# Patient Record
Sex: MALE | Race: WHITE | NOT HISPANIC OR LATINO | URBAN - METROPOLITAN AREA
[De-identification: names, ages, dates, MRNs, and addresses within clinical notes are randomized per-mention and may not be internally consistent; named-entity substitution may affect disease eponyms.]

---

## 2022-10-06 ENCOUNTER — OFFICE (OUTPATIENT)
Dept: URBAN - METROPOLITAN AREA CLINIC 67 | Facility: CLINIC | Age: 87
End: 2022-10-06
Payer: MEDICARE

## 2022-10-06 VITALS
HEART RATE: 50 BPM | OXYGEN SATURATION: 99 % | HEIGHT: 68 IN | SYSTOLIC BLOOD PRESSURE: 102 MMHG | WEIGHT: 161 LBS | DIASTOLIC BLOOD PRESSURE: 62 MMHG

## 2022-10-06 DIAGNOSIS — K21.9 GASTRO-ESOPHAGEAL REFLUX DISEASE WITHOUT ESOPHAGITIS: ICD-10-CM

## 2022-10-06 DIAGNOSIS — Z87.891 PERSONAL HISTORY OF NICOTINE DEPENDENCE: ICD-10-CM

## 2022-10-06 PROCEDURE — 99203 OFFICE O/P NEW LOW 30 MIN: CPT | Performed by: NURSE PRACTITIONER

## 2022-10-06 RX ORDER — FAMOTIDINE 40 MG/1
TABLET, FILM COATED ORAL
Qty: 90 | Refills: 1 | Status: ACTIVE
Start: 2022-10-06

## 2022-10-25 ENCOUNTER — OFFICE (OUTPATIENT)
Dept: URBAN - METROPOLITAN AREA CLINIC 67 | Facility: CLINIC | Age: 87
End: 2022-10-25
Payer: MEDICARE

## 2022-10-25 VITALS — HEART RATE: 40 BPM | SYSTOLIC BLOOD PRESSURE: 112 MMHG | HEIGHT: 68 IN | DIASTOLIC BLOOD PRESSURE: 58 MMHG

## 2022-10-25 DIAGNOSIS — K21.9 GASTRO-ESOPHAGEAL REFLUX DISEASE WITHOUT ESOPHAGITIS: ICD-10-CM

## 2022-10-25 PROCEDURE — 99204 OFFICE O/P NEW MOD 45 MIN: CPT | Performed by: INTERNAL MEDICINE

## 2022-10-25 RX ORDER — FAMOTIDINE 40 MG/1
TABLET, FILM COATED ORAL
Qty: 90 | Refills: 1 | Status: ACTIVE
Start: 2022-10-06

## 2022-10-25 NOTE — SERVICENOTES
I will have him change his medication so that he takes his Pantoprazole in the evening and Famotidine during the day - hopefully, this regimen will better control his symptoms. I asked them to call back in 2-3 weeks with an update.

## 2022-10-25 NOTE — SERVICEHPINOTES
The patient is seen today for follow-up regarding chronic GERD and dyspepsia-type symptoms. 
esme victoria At the time of his last office visit with one of our NPs on 10/6/22, he was continued on Pantoprazole 40mg in the morning and Famotidine was added at bedtime due to episodes of epigastric burning discomfort at night. 
br Today, he reports that his symptoms have improved with the Famotidine but have not resolved completely and continue to be most bothersome at night - per his report, he had a cardiac evaluation with Dr. Canales and no underlying cardiac etiology for his symptoms was found. 
br His most recent EGD done in 2014 (Dr. Coppola) was remarkable for changes suggestive of SSBE but biopsies were without evidence of barretts. esme

## 2023-05-10 ENCOUNTER — OFFICE (OUTPATIENT)
Dept: URBAN - METROPOLITAN AREA CLINIC 67 | Facility: CLINIC | Age: 88
End: 2023-05-10
Payer: MEDICARE

## 2023-05-10 VITALS
HEIGHT: 68 IN | DIASTOLIC BLOOD PRESSURE: 66 MMHG | OXYGEN SATURATION: 98 % | HEART RATE: 60 BPM | SYSTOLIC BLOOD PRESSURE: 114 MMHG | WEIGHT: 159 LBS

## 2023-05-10 DIAGNOSIS — K21.9 GASTRO-ESOPHAGEAL REFLUX DISEASE WITHOUT ESOPHAGITIS: ICD-10-CM

## 2023-05-10 DIAGNOSIS — Z98.890 OTHER SPECIFIED POSTPROCEDURAL STATES: ICD-10-CM

## 2023-05-10 DIAGNOSIS — R10.9 UNSPECIFIED ABDOMINAL PAIN: ICD-10-CM

## 2023-05-10 PROCEDURE — 99214 OFFICE O/P EST MOD 30 MIN: CPT | Performed by: INTERNAL MEDICINE

## 2023-05-10 NOTE — SERVICENOTES
I had a lengthy discussion with the patient and his wife - I discussed with them that I don't have a good explanation for his episodes of abdominal discomfort but I feel confident that they are not due to reflux. They confirm that these episodes do not linger longer than when he first wakes up and they are more of a nuisance than anything else.
I will have him continue to take his Pantoprazole in the evening (his wife confirms that he takes it on an empty stomach about 30 minutes before an evening snack) and then use OTC Gaviscon as needed in the morning and during the day for any breakthrough heartburn/reflux symptoms. 
For now, I will have them return to see me for follow-up as needed.

## 2023-05-10 NOTE — SERVICEHPINOTES
Kenny Escobar   is seen today for a follow-up visit regarding chronic GERD and dyspepsia-type symptoms. At the time of a follow-up visit with one of our NPs in 10/2022, he was continued on Pantoprazole 40mg in the morning and Famotidine was added at bedtime due to episodes of epigastric burning discomfort at night. Casey most recent EGD done in 2014 (Dr. Coppola) was remarkable for changes suggestive of SSBE but biopsies were without evidence of barretts.Today, he is accompanied by his wife and they report that he was hospitalized recently secondary to dizziness, blurry vision and hypotension. brDuring that hospitalization, an abdominal ultrasound (done on 4/17/23) was remarkable for a heterogenous liver echotexture, surgical absence of the gallbladder and simple right renal cysts.
esme Huertas continues to have episodes of an ill-descript "pain" that occurs intermittently in the morning when he first wakes up - it usually starts in his lower abdomen (below the umbilicus) and will radiate up either side/flank area and then up to his chest (both right and left side). The episode is usually short lived and the rest of the day he is without any discomfort. There is no associated emesis or GI tract bleeding symptoms and his weight has been stable. 
brDue to these symptoms, his Pantoprazole was increased to once daily (at night) to twice daily along with Famotidine as needed but they report that this has not helped with these episodes.  esme

## 2025-04-09 ENCOUNTER — OFFICE (OUTPATIENT)
Dept: URBAN - METROPOLITAN AREA CLINIC 67 | Facility: CLINIC | Age: OVER 89
End: 2025-04-09
Payer: MEDICARE

## 2025-04-09 VITALS
WEIGHT: 139 LBS | HEART RATE: 66 BPM | RESPIRATION RATE: 14 BRPM | SYSTOLIC BLOOD PRESSURE: 122 MMHG | DIASTOLIC BLOOD PRESSURE: 68 MMHG | HEIGHT: 68 IN

## 2025-04-09 DIAGNOSIS — K21.9 GASTRO-ESOPHAGEAL REFLUX DISEASE WITHOUT ESOPHAGITIS: ICD-10-CM

## 2025-04-09 DIAGNOSIS — K59.00 CONSTIPATION, UNSPECIFIED: ICD-10-CM

## 2025-04-09 PROCEDURE — 99214 OFFICE O/P EST MOD 30 MIN: CPT | Performed by: INTERNAL MEDICINE

## 2025-04-09 RX ORDER — PANTOPRAZOLE SODIUM 20 MG/1
TABLET, DELAYED RELEASE ORAL
Qty: 90 | Refills: 1 | Status: ACTIVE
Start: 2025-04-09

## 2025-04-09 NOTE — SERVICEHPINOTES
Kenny Escobar   is seen today for a follow-up visit   after a recent hospitalization. He was admitted to [facility] on 3/13/25 after presenting to the ER with abdominal discomfort, constipation and dizziness. brInitially blood work demonstrated a normal wbc count, normal hgb, plt 116 and creatinine 1.42.brAn initial non-constrasted CT of the abdomen/pelvis was remarkable for moderate to severe stool burden within the rectosigmoid colon with a fecaloma within the rectum (6.8cm) - it was also remarkable for prostatomegaly with mild bladder distension. He was treated with enemas and Miralax - he was also placed on empiric antibiotics due to the development of leukocytosis (13.3). brHe improved with supportive measures and a bowel regimen. 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 He was discharged home on 3/24/25 - blood work that morning demonstrated a wbc count 6.5, hgb 11.5, plt 138 and creatinine 0.94. Today, he is accompanied by his wife and reports that he has been doing well - he has been taking Miralax twice daily with 3-4 BMs/day over the past few days. 
Casey wife reports that he went off his Miralax for about a week or so prior to his hospitalization last month. esme

## 2025-04-09 NOTE — SERVICENOTES
He will remain on OTC Miralax but will cut back to once daily - we discussed that I recommend he take it at least once daily and I discussed with his wife that she can titrate up the dose as needed. I will keep him on once daily Pantoprazole but will decrease his dose to 20mg/day.
Otherwise, I will have him return for follow-up as needed.

## 2025-07-14 ENCOUNTER — OFFICE (OUTPATIENT)
Dept: URBAN - METROPOLITAN AREA CLINIC 67 | Facility: CLINIC | Age: OVER 89
End: 2025-07-14
Payer: MEDICARE

## 2025-07-14 VITALS
SYSTOLIC BLOOD PRESSURE: 126 MMHG | WEIGHT: 144 LBS | HEIGHT: 68 IN | HEART RATE: 48 BPM | OXYGEN SATURATION: 98 % | DIASTOLIC BLOOD PRESSURE: 74 MMHG

## 2025-07-14 DIAGNOSIS — Z79.02 LONG TERM (CURRENT) USE OF ANTITHROMBOTICS/ANTIPLATELETS: ICD-10-CM

## 2025-07-14 DIAGNOSIS — K62.5 HEMORRHAGE OF ANUS AND RECTUM: ICD-10-CM

## 2025-07-14 PROCEDURE — 99214 OFFICE O/P EST MOD 30 MIN: CPT | Performed by: INTERNAL MEDICINE

## 2025-07-14 RX ORDER — HYDROCORTISONE ACETATE 25 MG/1
SUPPOSITORY RECTAL
Qty: 15 | Refills: 2 | Status: ACTIVE
Start: 2025-07-14

## 2025-07-14 NOTE — SERVICENOTES
I will place him on hydrocortisone suppositories to use as needed for presumed symptomatic internal hemorrhoids (in the setting of Plavix use) and get a copy of his recent ER records. I will have him return for follow-up as needed.

## 2025-07-14 NOTE — SERVICEHPINOTES
Kenny Escobar   is seen today for a follow-up visit.    In 3/2025, he was admitted to [facility] after presenting to the ER with abdominal discomfort, constipation and dizziness.brInitially blood work demonstrated a normal wbc count, normal hgb, plt 116 and creatinine 1.42.brAn initial non-constrasted CT of the abdomen/pelvis was remarkable for moderate to severe stool burden within the rectosigmoid colon with a fecaloma within the rectum (6.8cm) - it was also remarkable for prostatomegaly with mild bladder distension.He was treated with enemas and Miralax - he was also placed on empiric antibiotics due to the development of leukocytosis (13.3).brHe improved with supportive measures and a bowel regimen.brA repeat non-contrasted CT of the abd/pelvis done on 3/20/25 revealed moderate formed stool burden with loose stool within the rectum with mild perirectal stranding (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 Today, he returns secondary to episodes of rectal bleeding that has been occuring with bowel movements - he reports seeing about "one teaspoon" of blood but denies any associated abdominal or rectal pain. He remains on Plavix and per his wife's report, an evaluation done in the [facility] ER a week ago (secondary to suspected food poisoning) was unremarkable and those records are forthcoming.

## 2025-08-25 ENCOUNTER — APPOINTMENT (OUTPATIENT)
Dept: URBAN - METROPOLITAN AREA SURGERY 12 | Age: OVER 89
Setting detail: DERMATOLOGY
End: 2025-08-26

## 2025-08-25 DIAGNOSIS — Z85.820 PERSONAL HISTORY OF MALIGNANT MELANOMA OF SKIN: ICD-10-CM

## 2025-08-25 DIAGNOSIS — L57.8 OTHER SKIN CHANGES DUE TO CHRONIC EXPOSURE TO NONIONIZING RADIATION: ICD-10-CM

## 2025-08-25 DIAGNOSIS — L57.0 ACTINIC KERATOSIS: ICD-10-CM

## 2025-08-25 DIAGNOSIS — D22 MELANOCYTIC NEVI: ICD-10-CM

## 2025-08-25 DIAGNOSIS — Z71.89 OTHER SPECIFIED COUNSELING: ICD-10-CM

## 2025-08-25 DIAGNOSIS — D18.0 HEMANGIOMA: ICD-10-CM

## 2025-08-25 DIAGNOSIS — L81.4 OTHER MELANIN HYPERPIGMENTATION: ICD-10-CM

## 2025-08-25 DIAGNOSIS — D49.2 NEOPLASM OF UNSPECIFIED BEHAVIOR OF BONE, SOFT TISSUE, AND SKIN: ICD-10-CM

## 2025-08-25 PROBLEM — D18.01 HEMANGIOMA OF SKIN AND SUBCUTANEOUS TISSUE: Status: ACTIVE | Noted: 2025-08-25

## 2025-08-25 PROBLEM — D22.9 MELANOCYTIC NEVI, UNSPECIFIED: Status: ACTIVE | Noted: 2025-08-25

## 2025-08-25 PROCEDURE — 11102 TANGNTL BX SKIN SINGLE LES: CPT | Mod: 59

## 2025-08-25 PROCEDURE — OTHER COUNSELING: OTHER

## 2025-08-25 PROCEDURE — 17004 DESTROY PREMAL LESIONS 15/>: CPT

## 2025-08-25 PROCEDURE — 99203 OFFICE O/P NEW LOW 30 MIN: CPT | Mod: 25

## 2025-08-25 PROCEDURE — OTHER SUNSCREEN RECOMMENDATIONS: OTHER

## 2025-08-25 PROCEDURE — OTHER LIQUID NITROGEN: OTHER

## 2025-08-25 PROCEDURE — OTHER BIOPSY BY SHAVE METHOD: OTHER

## 2025-08-25 ASSESSMENT — LOCATION SIMPLE DESCRIPTION DERM
LOCATION SIMPLE: RIGHT SCALP
LOCATION SIMPLE: RIGHT HAND
LOCATION SIMPLE: RIGHT EAR
LOCATION SIMPLE: RIGHT CHEEK
LOCATION SIMPLE: RIGHT TEMPLE
LOCATION SIMPLE: POSTERIOR SCALP
LOCATION SIMPLE: SUPERIOR FOREHEAD
LOCATION SIMPLE: LEFT EAR
LOCATION SIMPLE: NECK
LOCATION SIMPLE: LEFT FOREARM
LOCATION SIMPLE: SCALP
LOCATION SIMPLE: LEFT HAND

## 2025-08-25 ASSESSMENT — LOCATION DETAILED DESCRIPTION DERM
LOCATION DETAILED: LEFT SCAPHA
LOCATION DETAILED: RIGHT INFERIOR LATERAL MALAR CHEEK
LOCATION DETAILED: RIGHT LATERAL MALAR CHEEK
LOCATION DETAILED: RIGHT CENTRAL PARIETAL SCALP
LOCATION DETAILED: LEFT PROXIMAL DORSAL FOREARM
LOCATION DETAILED: LEFT ULNAR DORSAL HAND
LOCATION DETAILED: RIGHT CRUS OF HELIX
LOCATION DETAILED: RIGHT SUPERIOR LATERAL BUCCAL CHEEK
LOCATION DETAILED: RIGHT SUPERIOR CRUS OF ANTIHELIX
LOCATION DETAILED: RIGHT INFERIOR TEMPLE
LOCATION DETAILED: LEFT SUPERIOR HELIX
LOCATION DETAILED: RIGHT ULNAR DORSAL HAND
LOCATION DETAILED: RIGHT MID TEMPLE
LOCATION DETAILED: POSTERIOR MID-PARIETAL SCALP
LOCATION DETAILED: SUPERIOR MID FOREHEAD
LOCATION DETAILED: RIGHT MEDIAL FRONTAL SCALP
LOCATION DETAILED: LEFT DISTAL DORSAL FOREARM
LOCATION DETAILED: RIGHT CENTRAL LATERAL NECK
LOCATION DETAILED: RIGHT SUPERIOR PARIETAL SCALP
LOCATION DETAILED: LEFT CENTRAL PARIETAL SCALP

## 2025-08-25 ASSESSMENT — LOCATION ZONE DERM
LOCATION ZONE: SCALP
LOCATION ZONE: EAR
LOCATION ZONE: FACE
LOCATION ZONE: HAND
LOCATION ZONE: NECK
LOCATION ZONE: ARM